# Patient Record
Sex: FEMALE | Race: BLACK OR AFRICAN AMERICAN | NOT HISPANIC OR LATINO | Employment: FULL TIME | URBAN - METROPOLITAN AREA
[De-identification: names, ages, dates, MRNs, and addresses within clinical notes are randomized per-mention and may not be internally consistent; named-entity substitution may affect disease eponyms.]

---

## 2023-07-19 LAB
CLUE CELLS: ABNORMAL
NUGENT SCORE: 5
YEAST: ABNORMAL

## 2023-11-17 ENCOUNTER — TELEPHONE (OUTPATIENT)
Dept: OBSTETRICS AND GYNECOLOGY | Facility: CLINIC | Age: 38
End: 2023-11-17
Payer: COMMERCIAL

## 2023-11-17 NOTE — TELEPHONE ENCOUNTER
Previewed with pt. that was due for a Pap 10/2022. She was advised that she will need to come to the office for a self swab in order to be tx'd for her complaint. Pt was transferred to the  to schedule a swab visit and RENU.

## 2023-11-20 ENCOUNTER — CLINICAL SUPPORT (OUTPATIENT)
Dept: OBSTETRICS AND GYNECOLOGY | Facility: CLINIC | Age: 38
End: 2023-11-20
Payer: COMMERCIAL

## 2023-11-20 DIAGNOSIS — N89.8 VAGINAL DISCHARGE: Primary | ICD-10-CM

## 2023-11-20 PROCEDURE — 87205 SMEAR GRAM STAIN: CPT

## 2023-11-20 NOTE — PROGRESS NOTES
Pt has RENU scheduled 11/28/23. She came to office today for a self swab due to recurrent BV sx. Foul smelling discharge and odor x4d. Sample sent to lab.

## 2023-11-21 LAB
CLUE CELLS VAG LPF-#/AREA: ABNORMAL /[LPF]
NUGENT SCORE: 2
YEAST VAG WET PREP-#/AREA: PRESENT

## 2023-11-22 DIAGNOSIS — B37.9 YEAST INFECTION: Primary | ICD-10-CM

## 2023-11-22 RX ORDER — TERCONAZOLE 8 MG/G
1 CREAM VAGINAL NIGHTLY
Qty: 1 G | Refills: 0 | Status: SHIPPED | OUTPATIENT
Start: 2023-11-22 | End: 2023-11-25

## 2023-11-24 ENCOUNTER — TELEPHONE (OUTPATIENT)
Dept: OBSTETRICS AND GYNECOLOGY | Facility: CLINIC | Age: 38
End: 2023-11-24
Payer: COMMERCIAL

## 2023-11-28 ENCOUNTER — APPOINTMENT (OUTPATIENT)
Dept: OBSTETRICS AND GYNECOLOGY | Facility: CLINIC | Age: 38
End: 2023-11-28
Payer: COMMERCIAL

## 2023-12-05 ENCOUNTER — OFFICE VISIT (OUTPATIENT)
Dept: OBSTETRICS AND GYNECOLOGY | Facility: CLINIC | Age: 38
End: 2023-12-05
Payer: COMMERCIAL

## 2023-12-05 VITALS
WEIGHT: 293 LBS | DIASTOLIC BLOOD PRESSURE: 76 MMHG | HEIGHT: 67 IN | BODY MASS INDEX: 45.99 KG/M2 | SYSTOLIC BLOOD PRESSURE: 124 MMHG

## 2023-12-05 DIAGNOSIS — N89.8 VAGINAL IRRITATION: ICD-10-CM

## 2023-12-05 DIAGNOSIS — Z01.419 WELL WOMAN EXAM WITH ROUTINE GYNECOLOGICAL EXAM: Primary | ICD-10-CM

## 2023-12-05 DIAGNOSIS — Z12.4 CERVICAL CANCER SCREENING: ICD-10-CM

## 2023-12-05 DIAGNOSIS — Z11.3 SCREEN FOR STD (SEXUALLY TRANSMITTED DISEASE): ICD-10-CM

## 2023-12-05 PROCEDURE — 87205 SMEAR GRAM STAIN: CPT

## 2023-12-05 PROCEDURE — 99395 PREV VISIT EST AGE 18-39: CPT

## 2023-12-05 PROCEDURE — 88175 CYTOPATH C/V AUTO FLUID REDO: CPT

## 2023-12-05 PROCEDURE — 87624 HPV HI-RISK TYP POOLED RSLT: CPT

## 2023-12-05 PROCEDURE — 87661 TRICHOMONAS VAGINALIS AMPLIF: CPT

## 2023-12-05 PROCEDURE — 87800 DETECT AGNT MULT DNA DIREC: CPT

## 2023-12-05 NOTE — PROGRESS NOTES
"Assessment/Plan   Diagnoses and all orders for this visit:  Cervical cancer screening  -     THINPREP PAP TEST  -     HPV DNA High Risk With Genotype  Vaginal irritation  -     Vaginitis Gram Stain For Bacterial Vaginosis + Yeast  Screen for STD (sexually transmitted disease)  -     C. Trachomatis / N. Gonorrhoeae, Amplified Detection  -     Trichomonas vaginalis, Nucleic Acid Detection      CARLA Carter-CNM     Subjective   Winnie Ackerman is a 38 y.o. female who is here for a routine exam.     Concerns today:  None     Patient's last menstrual period was 2023.   Periods are regular every 28-30 days, lasting 5 days.   Dysmenorrhea:moderate, occurring throughout menses.   Cyclic symptoms include  cramping  .     Sexual Activity: sexually active, male partners; Patient reports 1 partners in the last 12 months.  Pain with intercourse? No   Loss of desire? No   Able to have an orgasm? Yes     History of prior STI: chlamydia and trichomonas    Current contraception: none    Last pap: 2017 - NILM  History of abnormal Pap smear: no  Family history of uterine or ovarian cancer: no    Last mammogram: n/a  History of abnormal mammogram: no  Family history of breast cancer: no  Menstrual History:  OB History          6    Para   4    Term   4            AB   2    Living   4         SAB        IAB   2    Ectopic        Multiple        Live Births   4                Menarche age: 18  Patient's last menstrual period was 2023.       Objective   /76   Ht 1.702 m (5' 7\")   Wt 134 kg (295 lb)   LMP 2023   BMI 46.20 kg/m²   Physical Exam  Constitutional:       General: She is not in acute distress.     Appearance: Normal appearance. She is normal weight. She is not ill-appearing.   Genitourinary:      Vulva, bladder, rectum and urethral meatus normal.      No lesions in the vagina.      Right Labia: No rash, tenderness, lesions, skin changes or Bartholin's cyst.     Left Labia: No " tenderness, lesions, skin changes, Bartholin's cyst or rash.     No labial fusion noted.      No vaginal discharge, erythema, tenderness, bleeding or ulceration.      No cervical discharge, lesion or polyp.      No urethral prolapse, mass or discharge present.      Pelvic exam was performed with patient in the lithotomy position.   Breasts:     Breasts are symmetrical.      Breasts are soft.     Right: Normal.      Left: Normal.   HENT:      Head: Normocephalic.      Right Ear: External ear normal.      Left Ear: External ear normal.      Nose: Nose normal.      Mouth/Throat:      Mouth: Mucous membranes are moist.      Pharynx: Oropharynx is clear.   Eyes:      General:         Right eye: No discharge.         Left eye: No discharge.      Pupils: Pupils are equal, round, and reactive to light.   Pulmonary:      Effort: Pulmonary effort is normal. No respiratory distress.   Abdominal:      General: Abdomen is flat.      Palpations: Abdomen is soft.      Tenderness: There is no abdominal tenderness.   Musculoskeletal:         General: No swelling, tenderness, deformity or signs of injury. Normal range of motion.      Cervical back: Normal range of motion. No rigidity or tenderness.   Neurological:      General: No focal deficit present.      Mental Status: She is alert and oriented to person, place, and time. Mental status is at baseline.      Sensory: No sensory deficit.      Motor: No weakness.   Skin:     General: Skin is warm and dry.      Coloration: Skin is not jaundiced.      Findings: No bruising, erythema, lesion or rash.   Psychiatric:         Mood and Affect: Mood normal.         Behavior: Behavior normal.         Thought Content: Thought content normal.         Judgment: Judgment normal.

## 2023-12-06 LAB
BACTERIAL VAGINOSIS VAG-IMP: NORMAL
C TRACH RRNA SPEC QL NAA+PROBE: NEGATIVE
CLUE CELLS VAG LPF-#/AREA: NORMAL /[LPF]
N GONORRHOEA DNA SPEC QL PROBE+SIG AMP: NEGATIVE
NUGENT SCORE: 4
T VAGINALIS RRNA SPEC QL NAA+PROBE: NEGATIVE
YEAST VAG WET PREP-#/AREA: NORMAL

## 2023-12-08 ENCOUNTER — TELEPHONE (OUTPATIENT)
Dept: OBSTETRICS AND GYNECOLOGY | Facility: CLINIC | Age: 38
End: 2023-12-08
Payer: COMMERCIAL

## 2023-12-08 DIAGNOSIS — B96.89 BACTERIAL VAGINOSIS: ICD-10-CM

## 2023-12-08 DIAGNOSIS — N76.0 BACTERIAL VAGINOSIS: ICD-10-CM

## 2023-12-08 RX ORDER — METRONIDAZOLE 500 MG/1
500 TABLET ORAL 2 TIMES DAILY
Qty: 14 TABLET | Refills: 0 | Status: SHIPPED | OUTPATIENT
Start: 2023-12-08 | End: 2023-12-15

## 2023-12-08 NOTE — TELEPHONE ENCOUNTER
Pt contacted.    Discussed lab results.  Knows pap is pending.  GC/CT & trich negative.    Bv swab  revealed violette score of 4.  Pt would like to be treated with PO flagyl.     Order placed for CNM to approve.

## 2024-01-16 ENCOUNTER — TELEPHONE (OUTPATIENT)
Dept: OBSTETRICS AND GYNECOLOGY | Facility: CLINIC | Age: 39
End: 2024-01-16
Payer: COMMERCIAL

## 2024-01-16 NOTE — TELEPHONE ENCOUNTER
Pt contacted.     Just finished taking course of flagyl.    Pt was delayed in taking it.  Upon chart review, office sent script in a month ago for bv violette score of 4 @ the time.  Pt c/o  vaginal discharge started this morning.    Need for self swab advised.  Pt plans on coming to office tomorrow.

## 2024-01-17 ENCOUNTER — CLINICAL SUPPORT (OUTPATIENT)
Dept: OBSTETRICS AND GYNECOLOGY | Facility: CLINIC | Age: 39
End: 2024-01-17
Payer: COMMERCIAL

## 2024-01-17 DIAGNOSIS — N89.8 VAGINAL IRRITATION: ICD-10-CM

## 2024-01-17 PROCEDURE — 87205 SMEAR GRAM STAIN: CPT

## 2024-01-18 ENCOUNTER — TELEPHONE (OUTPATIENT)
Dept: OBSTETRICS AND GYNECOLOGY | Facility: CLINIC | Age: 39
End: 2024-01-18
Payer: COMMERCIAL

## 2024-01-18 LAB
BACTERIAL VAGINOSIS VAG-IMP: NORMAL
CLUE CELLS VAG LPF-#/AREA: NORMAL /[LPF]
NUGENT SCORE: 4
YEAST VAG WET PREP-#/AREA: NORMAL

## 2024-01-18 NOTE — TELEPHONE ENCOUNTER
Pt contacted.    Discuss bv swab result.   Will have CNM review.  Pt feels raw and irritated and itchy.

## 2024-01-23 ENCOUNTER — OFFICE VISIT (OUTPATIENT)
Dept: OBSTETRICS AND GYNECOLOGY | Facility: CLINIC | Age: 39
End: 2024-01-23
Payer: COMMERCIAL

## 2024-01-23 VITALS
SYSTOLIC BLOOD PRESSURE: 128 MMHG | WEIGHT: 293 LBS | HEIGHT: 67 IN | DIASTOLIC BLOOD PRESSURE: 74 MMHG | BODY MASS INDEX: 45.99 KG/M2

## 2024-01-23 DIAGNOSIS — B96.89 BACTERIAL VAGINOSIS: Primary | ICD-10-CM

## 2024-01-23 DIAGNOSIS — N89.8 VAGINAL IRRITATION: ICD-10-CM

## 2024-01-23 DIAGNOSIS — N76.0 BACTERIAL VAGINOSIS: Primary | ICD-10-CM

## 2024-01-23 PROCEDURE — 87661 TRICHOMONAS VAGINALIS AMPLIF: CPT

## 2024-01-23 PROCEDURE — 87205 SMEAR GRAM STAIN: CPT

## 2024-01-23 PROCEDURE — 99213 OFFICE O/P EST LOW 20 MIN: CPT

## 2024-01-23 PROCEDURE — 87800 DETECT AGNT MULT DNA DIREC: CPT

## 2024-01-23 RX ORDER — METRONIDAZOLE 7.5 MG/G
GEL VAGINAL DAILY
Qty: 70 G | Refills: 0 | Status: SHIPPED | OUTPATIENT
Start: 2024-01-23 | End: 2024-01-28

## 2024-01-23 ASSESSMENT — PAIN SCALES - GENERAL: PAINLEVEL: 0-NO PAIN

## 2024-01-23 NOTE — PROGRESS NOTES
Assessment/Plan   Diagnoses and all orders for this visit:  Bacterial vaginosis  -     metroNIDAZOLE (Metrogel) 0.75 % (37.5mg/5 gram) vaginal gel; Insert into the vagina once daily for 5 days.  Vaginal irritation  -     C. Trachomatis / N. Gonorrhoeae, Amplified Detection  -     Trichomonas vaginalis, Amplified  -     Vaginitis Gram Stain For Bacterial Vaginosis + Yeast    Will treat for BV based on presenting s/s and previous violette score. Discussed vaginal hygiene and care for ph balance.     CARLA Carter-TUCKER    Subjective   Winnie Ackerman is a 38 y.o. female who complains of vaginal discomfort.    HPI:  Symptoms reported: discharge and local irritation and mal odor   Onset: 6 days ago  Course: persistent  Progression: stayed the same    Pt stopped in the office 5 days ago for a vaginitis self swab that was negative for clue cells with a violette score of 4.     Helpful treatments:  none  Unhelpful treatments tried:  tried 1 dose of boric acid and had some spotting and has not tried anything else      Sexual history reviewed with the patient.   STI exposures include none.   Patient reports previous history of the following STIs: none.    Objective   Physical Exam:   Physical Exam  Constitutional:       Appearance: Normal appearance. She is normal weight.   Genitourinary:      Vulva normal.      No lesions in the vagina.      Genitourinary Comments: Pt on period - unable to assess if discharge was present.   Malodor present       Vaginal bleeding present.      No vaginal erythema or tenderness.      No cervical discharge or friability.   HENT:      Head: Normocephalic.      Mouth/Throat:      Mouth: Mucous membranes are moist.      Pharynx: Oropharynx is clear.   Pulmonary:      Effort: Pulmonary effort is normal.   Abdominal:      General: Abdomen is flat.      Palpations: Abdomen is soft.   Musculoskeletal:         General: Normal range of motion.   Neurological:      Mental Status: She is alert.   Skin:      General: Skin is warm and dry.
